# Patient Record
Sex: MALE | Race: WHITE | URBAN - METROPOLITAN AREA
[De-identification: names, ages, dates, MRNs, and addresses within clinical notes are randomized per-mention and may not be internally consistent; named-entity substitution may affect disease eponyms.]

---

## 2019-10-25 ENCOUNTER — APPOINTMENT (RX ONLY)
Dept: URBAN - METROPOLITAN AREA CLINIC 139 | Facility: CLINIC | Age: 19
Setting detail: DERMATOLOGY
End: 2019-10-25

## 2019-10-25 DIAGNOSIS — L70.0 ACNE VULGARIS: ICD-10-CM

## 2019-10-25 DIAGNOSIS — D22 MELANOCYTIC NEVI: ICD-10-CM

## 2019-10-25 PROBLEM — D22.5 MELANOCYTIC NEVI OF TRUNK: Status: ACTIVE | Noted: 2019-10-25

## 2019-10-25 PROBLEM — D22.4 MELANOCYTIC NEVI OF SCALP AND NECK: Status: ACTIVE | Noted: 2019-10-25

## 2019-10-25 PROCEDURE — ? ORDER TESTS

## 2019-10-25 PROCEDURE — 10060 I&D ABSCESS SIMPLE/SINGLE: CPT

## 2019-10-25 PROCEDURE — ? PRESCRIPTION SAMPLES PROVIDED

## 2019-10-25 PROCEDURE — ? COUNSELING

## 2019-10-25 PROCEDURE — 99202 OFFICE O/P NEW SF 15 MIN: CPT | Mod: 25

## 2019-10-25 PROCEDURE — ? INCISION AND DRAINAGE

## 2019-10-25 PROCEDURE — ? ADDITIONAL NOTES

## 2019-10-25 PROCEDURE — ? PRESCRIPTION

## 2019-10-25 RX ORDER — TRETINOIN 0.5 MG/G
CREAM TOPICAL
Qty: 1 | Refills: 0 | Status: ERX | COMMUNITY
Start: 2019-10-25

## 2019-10-25 RX ORDER — DOXYCYCLINE HYCLATE 100 MG/1
CAPSULE, GELATIN COATED ORAL BID
Qty: 20 | Refills: 0 | Status: ERX | COMMUNITY
Start: 2019-10-25

## 2019-10-25 RX ADMIN — TRETINOIN: 0.5 CREAM TOPICAL at 00:00

## 2019-10-25 RX ADMIN — DOXYCYCLINE HYCLATE: 100 CAPSULE, GELATIN COATED ORAL at 00:00

## 2019-10-25 ASSESSMENT — LOCATION SIMPLE DESCRIPTION DERM
LOCATION SIMPLE: RIGHT UPPER BACK
LOCATION SIMPLE: RIGHT CHEEK
LOCATION SIMPLE: RIGHT FOREHEAD
LOCATION SIMPLE: LEFT ANTERIOR NECK
LOCATION SIMPLE: LEFT LIP

## 2019-10-25 ASSESSMENT — LOCATION DETAILED DESCRIPTION DERM
LOCATION DETAILED: RIGHT SUPERIOR UPPER BACK
LOCATION DETAILED: RIGHT INFERIOR MEDIAL FOREHEAD
LOCATION DETAILED: RIGHT MEDIAL MALAR CHEEK
LOCATION DETAILED: LEFT INFERIOR LATERAL NECK
LOCATION DETAILED: LEFT LOWER CUTANEOUS LIP

## 2019-10-25 ASSESSMENT — LOCATION ZONE DERM
LOCATION ZONE: NECK
LOCATION ZONE: FACE
LOCATION ZONE: LIP
LOCATION ZONE: TRUNK

## 2019-10-25 NOTE — PROCEDURE: ADDITIONAL NOTES
Detail Level: Zone
Additional Notes: Pt has tried and failed treating his nodulocystic acne with oral antibiotics and topicals. We discussed Isotretinoin today. Brochure given for patient to read over. Patient does not have a h/o depression. He understands that we require monthly lab monitoring and the potential adverse events and side effects of the medication.

## 2019-10-25 NOTE — HPI: INFECTION (ABSCESS)
How Severe Is It?: mild
Is This A New Presentation, Or A Follow-Up?: Follow Up Abscess
Additional History: Patient was previously prescribed Epi Duo. Patient states the prescription is not working the same and would like to look into some alternatives

## 2019-10-25 NOTE — PROCEDURE: INCISION AND DRAINAGE
Curette: No
Consent was obtained and risks were reviewed including but not limited to delayed wound healing, infection, need for multiple I and D's, and pain.
Epidermal Closure: simple interrupted
Post-Care Instructions: I reviewed with the patient in detail post-care instructions. Patient should keep wound covered and call the office should any redness, pain, swelling or worsening occur.
Method: 11 blade
Preparation Text: The area was prepped in the usual clean fashion.
Anesthesia Volume In Cc: 1
Epidermal Sutures: 4-0 Ethilon
Lesion Type: Abscess
Anesthesia Type: 1% lidocaine with epinephrine
Detail Level: Detailed
Dressing: dry sterile dressing
Wound Care: Petrolatum
Curette Text (Optional): After the contents were expressed a curette was used to partially remove the cyst wall.
Size Of Lesion In Cm (Optional But May Be Required For Some Insurances): 0
Suture Text: The incision was partially closed with

## 2019-10-31 ENCOUNTER — RX ONLY (OUTPATIENT)
Age: 19
Setting detail: RX ONLY
End: 2019-10-31

## 2019-10-31 RX ORDER — SULFAMETHOXAZOLE AND TRIMETHOPRIM 800; 160 MG/1; MG/1
TABLET ORAL BID
Qty: 20 | Refills: 0 | Status: ERX | COMMUNITY
Start: 2019-10-31

## 2019-10-31 RX ORDER — SULFAMETHOXAZOLE AND TRIMETHOPRIM 800; 160 MG/1; MG/1
TABLET ORAL BID
Qty: 20 | Refills: 0 | Status: ERX

## 2019-11-11 ENCOUNTER — RX ONLY (OUTPATIENT)
Age: 19
Setting detail: RX ONLY
End: 2019-11-11

## 2019-11-11 RX ORDER — CLINDAMYCIN PHOS/BENZOYL PEROX 1 %-5 %
1 GEL (GRAM) TOPICAL QAM
Qty: 1 | Refills: 2 | Status: ERX | COMMUNITY
Start: 2019-11-11

## 2019-11-11 RX ORDER — SULFAMETHOXAZOLE AND TRIMETHOPRIM 800; 160 MG/1; MG/1
1 TABLET ORAL BID
Qty: 20 | Refills: 0 | Status: ERX